# Patient Record
Sex: MALE
[De-identification: names, ages, dates, MRNs, and addresses within clinical notes are randomized per-mention and may not be internally consistent; named-entity substitution may affect disease eponyms.]

---

## 2017-02-01 ENCOUNTER — APPOINTMENT (OUTPATIENT)
Dept: INTERNAL MEDICINE | Facility: CLINIC | Age: 52
End: 2017-02-01

## 2017-02-01 VITALS
WEIGHT: 185 LBS | TEMPERATURE: 98.2 F | SYSTOLIC BLOOD PRESSURE: 126 MMHG | DIASTOLIC BLOOD PRESSURE: 84 MMHG | OXYGEN SATURATION: 97 % | RESPIRATION RATE: 12 BRPM | HEIGHT: 66 IN | BODY MASS INDEX: 29.73 KG/M2 | HEART RATE: 77 BPM

## 2017-02-01 DIAGNOSIS — Z87.09 PERSONAL HISTORY OF OTHER DISEASES OF THE RESPIRATORY SYSTEM: ICD-10-CM

## 2017-02-01 LAB
FLUAV SPEC QL CULT: NEGATIVE
FLUBV AG SPEC QL IA: NEGATIVE

## 2017-02-01 RX ORDER — KETOCONAZOLE 20 MG/G
2 CREAM TOPICAL
Qty: 60 | Refills: 0 | Status: DISCONTINUED | COMMUNITY
Start: 2016-11-15

## 2017-03-13 ENCOUNTER — APPOINTMENT (OUTPATIENT)
Dept: INTERNAL MEDICINE | Facility: CLINIC | Age: 52
End: 2017-03-13

## 2020-01-16 ENCOUNTER — APPOINTMENT (OUTPATIENT)
Dept: OTOLARYNGOLOGY | Facility: CLINIC | Age: 55
End: 2020-01-16
Payer: COMMERCIAL

## 2020-01-16 VITALS
DIASTOLIC BLOOD PRESSURE: 79 MMHG | WEIGHT: 185 LBS | HEIGHT: 66 IN | HEART RATE: 92 BPM | SYSTOLIC BLOOD PRESSURE: 131 MMHG | BODY MASS INDEX: 29.73 KG/M2

## 2020-01-16 DIAGNOSIS — J30.0 VASOMOTOR RHINITIS: ICD-10-CM

## 2020-01-16 DIAGNOSIS — H90.A22 SENSORINEURAL HEARING LOSS, UNILATERAL, LEFT EAR, WITH RESTRICTED HEARING ON THE CONTRALATERAL SIDE: ICD-10-CM

## 2020-01-16 DIAGNOSIS — H93.11 TINNITUS, RIGHT EAR: ICD-10-CM

## 2020-01-16 DIAGNOSIS — H90.A31 MIXED CONDUCTIVE AND SENSORINEURAL HEARING LOSS, UNILATERAL, RIGHT EAR WITH RESTRICTED HEARING ON THE  CONTRALATERAL SIDE: ICD-10-CM

## 2020-01-16 DIAGNOSIS — H90.5 UNSPECIFIED SENSORINEURAL HEARING LOSS: ICD-10-CM

## 2020-01-16 PROCEDURE — 92567 TYMPANOMETRY: CPT

## 2020-01-16 PROCEDURE — 92557 COMPREHENSIVE HEARING TEST: CPT

## 2020-01-16 PROCEDURE — 31231 NASAL ENDOSCOPY DX: CPT

## 2020-01-16 PROCEDURE — 99203 OFFICE O/P NEW LOW 30 MIN: CPT | Mod: 25

## 2020-01-16 PROCEDURE — 92504 EAR MICROSCOPY EXAMINATION: CPT

## 2020-01-16 RX ORDER — FLUTICASONE PROPIONATE 50 UG/1
50 SPRAY, METERED NASAL DAILY
Qty: 1 | Refills: 3 | Status: ACTIVE | COMMUNITY
Start: 2020-01-16 | End: 1900-01-01

## 2020-01-16 NOTE — CONSULT LETTER
[Please see my note below.] : Please see my note below. [FreeTextEntry2] : Dear Dr Aurelia Tatum\par [address] [employer]\par [facility]\par [place] [FreeTextEntry1] : Thank you for allowing me to participate in the care of SOFÍA MENDOZA .\par Please see the attached visit note.\par \par \par \par Mark Espinoza\par Otology\par Department of Otolaryngology\par [facility]

## 2020-01-16 NOTE — ASSESSMENT
[FreeTextEntry1] : Complete audiometry was ordered and completed today. This was separately reported by the audiologist. The results were reviewed in detail with the patient.\par \par \par Significant asymmetric hearing loss with limited weren't understanding in the right ear. This is of uncertain etiology. Given his prior history of cerebellar neoplasia and surgery/radiation to the region, I suspect this may be related. Acoustic neuroma should be ruled out. I have discussed this with him and he recommended attention to the internal auditory canals during upcoming surveillance MRI of the brain.\par \par I recommend followup with an audiologist for hearing aid evaluation. I recommend followup with me in 6 months with repeat audiometry.

## 2020-01-16 NOTE — PHYSICAL EXAM
[Midline] : trachea located in midline position [Normal] : no rashes [FreeTextEntry1] : Procedure: Microscopic Ear Exam\par \par Left ear:  Ear canal intact without inflammation or lesion.  \par Tympanic membrane intact without inflammation.\par \par Right ear:  Ear canal intact without inflammation or lesion.  \par Tympanic membrane intact without inflammation.\par \par

## 2020-01-16 NOTE — HISTORY OF PRESENT ILLNESS
[de-identified] : SOFÍA MENDOZA has a history of hearing loss and tinnitus in the right ear for about 18 months.  No apparent trigger.  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  No vertigo or dizziness reported.  Imbalance reported at times attributed to cerebellum disease.\par Reports frequent nasal obstruction and rhinorrhea.\par \par PMH: Medullo-blastoma of the cerebellum requiring surgery, and radiation.  Routine follow up includes MRI scans.

## 2020-01-28 ENCOUNTER — APPOINTMENT (OUTPATIENT)
Dept: OTOLARYNGOLOGY | Facility: CLINIC | Age: 55
End: 2020-01-28
Payer: COMMERCIAL

## 2020-01-28 DIAGNOSIS — Z71.89 OTHER SPECIFIED COUNSELING: ICD-10-CM

## 2020-01-28 PROCEDURE — V5010 ASSESSMENT FOR HEARING AID: CPT | Mod: NC

## 2020-02-04 ENCOUNTER — APPOINTMENT (OUTPATIENT)
Dept: OTOLARYNGOLOGY | Facility: CLINIC | Age: 55
End: 2020-02-04

## 2020-07-16 ENCOUNTER — APPOINTMENT (OUTPATIENT)
Dept: OTOLARYNGOLOGY | Facility: CLINIC | Age: 55
End: 2020-07-16

## 2020-12-15 PROBLEM — Z87.09 HISTORY OF INFLUENZA: Status: RESOLVED | Noted: 2017-02-01 | Resolved: 2020-12-15
